# Patient Record
Sex: MALE | Race: WHITE | NOT HISPANIC OR LATINO | Employment: OTHER | URBAN - METROPOLITAN AREA
[De-identification: names, ages, dates, MRNs, and addresses within clinical notes are randomized per-mention and may not be internally consistent; named-entity substitution may affect disease eponyms.]

---

## 2019-12-09 ENCOUNTER — HISTORICAL (OUTPATIENT)
Dept: RADIOLOGY | Facility: HOSPITAL | Age: 59
End: 2019-12-09

## 2021-10-05 ENCOUNTER — HISTORICAL (OUTPATIENT)
Dept: INTENSIVE CARE | Facility: HOSPITAL | Age: 61
End: 2021-10-05

## 2021-10-13 ENCOUNTER — HISTORICAL (OUTPATIENT)
Dept: INTENSIVE CARE | Facility: HOSPITAL | Age: 61
End: 2021-10-13

## 2022-04-09 ENCOUNTER — HISTORICAL (OUTPATIENT)
Dept: ADMINISTRATIVE | Facility: HOSPITAL | Age: 62
End: 2022-04-09

## 2022-04-27 VITALS — WEIGHT: 280 LBS | BODY MASS INDEX: 39.2 KG/M2 | HEIGHT: 71 IN

## 2023-01-31 ENCOUNTER — OFFICE VISIT (OUTPATIENT)
Dept: NEUROLOGY | Facility: CLINIC | Age: 63
End: 2023-01-31
Payer: COMMERCIAL

## 2023-01-31 DIAGNOSIS — G47.33 OBSTRUCTIVE SLEEP APNEA: Primary | ICD-10-CM

## 2023-01-31 PROCEDURE — 99212 PR OFFICE/OUTPT VISIT, EST, LEVL II, 10-19 MIN: ICD-10-PCS | Mod: 95,,, | Performed by: NURSE PRACTITIONER

## 2023-01-31 PROCEDURE — 99212 OFFICE O/P EST SF 10 MIN: CPT | Mod: 95,,, | Performed by: NURSE PRACTITIONER

## 2023-01-31 RX ORDER — AMLODIPINE AND BENAZEPRIL HYDROCHLORIDE 10; 20 MG/1; MG/1
1 CAPSULE ORAL DAILY
COMMUNITY

## 2023-01-31 RX ORDER — ATORVASTATIN CALCIUM 40 MG/1
40 TABLET, FILM COATED ORAL DAILY
COMMUNITY

## 2023-01-31 NOTE — PROGRESS NOTES
Telephone Visit     SUBJECTIVE:  Patient ID: Jose Hidalgo is a 62 y.o. male.  MRN: [id number]  Referred By: No ref. provider found  Past Medical History:   Diagnosis Date    Essential (primary) hypertension     High cholesterol     Obesity, unspecified     Obstructive sleep apnea        Past Surgical History:   Procedure Laterality Date    Open repair of inguinal hernia (1975)         Family History   Problem Relation Age of Onset    Alzheimer's disease Father        Social History     Socioeconomic History    Marital status:        Review of patient's allergies indicates:   Allergen Reactions    Tetanus vaccines and toxoid            Chief Complaint: Annual CPAP f/u    History of Present Illness:     Telephone visit for PAP follow up (attempted telemedicine).   Reports wearing PAP nightly. Admits nocturnal awakenings some nights, and difficulty falling back to sleep. Some nights will take Melatonin OTC to help him get a better night sleep.  Feels better the following day after PAP use; denies EDS. Denies naps  Denies problems with PAP machine. Able to get supplies.  Needs to switch supply companies d/t insurance         Review of Systems - as per HPI, otherwise a balanced 10 systems review is negative.    This is a telephone note; the patient was treated using real time audio according to [facility] protocols. Kalina RANGEL, (distant provider) conducted the visit from the location identified below. The patient participated in the visit at a non-[facility] location selected by the patient (or patient's representative), identified below. I am licensed in the state where the patient stated they are located. The patient (or patient's representative) stated that they understood and accepted the privacy and security risks to their information at their location.    Patient was located at their house  Kalina RANGEL (distant provider), was located at [facility]      OBJECTIVE:    PAP  Compliance Report  Last 30 days  Usage- 100  Usage > 4 hrs - 98  AHI -  0.5    PLAN:  Problem List Items Addressed This Visit          Other    Obstructive sleep apnea - Primary    - Encouraged continued use of PAP.   - Drowsy driving may still occur despite PAP use.   - F/u in 1 yr       Ok to continue taking melatonin OTC, prn insomnia/ frequent nocturnal awakenings. Start with 3mg, and can increase to up to 10mg nightly. Take immediately prior to sleep onset.            Audio time was spent with patient for education and counseling regarding: medical conditions, current medications including risk/ benefit and side effect/ adverse events, over the counter medications- uses/doses, home self-care and contact precautions, and red flags and indications for immediate medical attention. The patient is receptive, expresses understanding and is agreeable to the discussed plan. All of the patient's questions were answered. T     _*_single dx ___multiple issues/ diagnoses  _*_ low __mod ___ high complexity of data  _*_low __mod ___ high risks     Medical Decision Making (MDM) used for CPT choice:  __*_low  ___moderate  ____high        MARLYN Zamora  [facility]  [phone]

## 2024-04-09 DIAGNOSIS — G47.33 OBSTRUCTIVE SLEEP APNEA: Primary | ICD-10-CM

## 2024-04-10 ENCOUNTER — OFFICE VISIT (OUTPATIENT)
Dept: NEUROLOGY | Facility: CLINIC | Age: 64
End: 2024-04-10
Payer: COMMERCIAL

## 2024-04-10 DIAGNOSIS — G47.33 OBSTRUCTIVE SLEEP APNEA: Primary | ICD-10-CM

## 2024-04-10 PROCEDURE — 4010F ACE/ARB THERAPY RXD/TAKEN: CPT | Mod: CPTII,95,, | Performed by: NURSE PRACTITIONER

## 2024-04-10 PROCEDURE — 99213 OFFICE O/P EST LOW 20 MIN: CPT | Mod: 95,,, | Performed by: NURSE PRACTITIONER

## 2024-04-10 NOTE — PROGRESS NOTES
Telemedicine Visit   Established SHAZIA    SUBJECTIVE:  Patient ID: Jose Hidalgo is a 63 y.o. male.  MRN: [id number]  Referred By: No ref. provider found  Past Medical History:   Diagnosis Date    Essential (primary) hypertension     High cholesterol     Obesity, unspecified     Obstructive sleep apnea        Past Surgical History:   Procedure Laterality Date    Open repair of inguinal hernia (1975)         Family History   Problem Relation Age of Onset    Alzheimer's disease Father        Social History     Socioeconomic History    Marital status:        Review of patient's allergies indicates:   Allergen Reactions    Tetanus vaccines and toxoid        Chief Complaint: SHAZIA f/u    History of Present Illness:   Telemedicine visit for PAP follow up. Sleeping well at night with PAP. Feels better the following day after PAP use; denies EDS.     Denies problems with PAP machine.  Needs updated order for supplies; would like order to be sent to [facility]    Do not have compliance details to review.  Patient has I code connect machine    Current Medications:    Current Outpatient Medications:     amlodipine-benazepril 10-20mg (LOTREL) 10-20 mg per capsule, Take 1 capsule by mouth once daily., Disp: , Rfl:     atorvastatin (LIPITOR) 40 MG tablet, Take 40 mg by mouth once daily., Disp: , Rfl:     Review of Systems - as per HPI, otherwise a balanced 10 systems review is negative.    This is a telemedicine note; the patient was treated using telemedicine, real time audio and video, according to [facility] protocols. Kalina RANGEL, (distant provider) conducted the visit from the location identified below. The patient participated in the visit at a non-[facility] location selected by the patient (or patient's representative), identified below. I am licensed in the state where the patient stated they are located. The patient (or patient's representative) stated that they understood and accepted the privacy and security  risks to their information at their location.    Patient was located at their house [electricity out]  I, Kalina Jon (distant provider), was located at my house      OBJECTIVE:   (Limited d/t telemed visit and his electricity was out)  Well nourished, no distress  Alert, oriented  Calm, well spoken; normal affect and mood      PLAN:  Problem List Items Addressed This Visit          Other    Obstructive sleep apnea - Primary    - Patient has iCode connect machine.  Will get SD card to see if we can download  - Continues to benefit from PAP therapy. Encouraged nightly use of PAP.   - Drowsy driving may still occur despite PAP use.   - importance of healthy diet, regular exercise and sleep hygiene discussed  - RTC in 1 yr     Face to face time was spent with patient for education and counseling regarding: medical conditions, current medications including risk/ benefit and side effect/ adverse events, over the counter medications- uses/doses, home self-care and contact precautions, and red flags and indications for immediate medical attention. The patient is receptive, expresses understanding and is agreeable to the discussed plan. All of the patient's questions were answered.     _*_single dx ___multiple issues/ diagnoses  __* low __mod ___ high complexity of data  _*_low __mod ___ high risks     Medical Decision Making (MDM) used for CPT choice:  __*_low  ___moderate  ____high        MARLYN Zamora  [facility]  [phone]     Compliance Report  Last 30 days  Usage- 100  Usage > 4 hrs - 100  AHI - 0.5

## 2024-04-15 ENCOUNTER — PATIENT MESSAGE (OUTPATIENT)
Dept: NEUROLOGY | Facility: CLINIC | Age: 64
End: 2024-04-15
Payer: COMMERCIAL

## 2025-05-06 ENCOUNTER — OFFICE VISIT (OUTPATIENT)
Facility: CLINIC | Age: 65
End: 2025-05-06
Payer: COMMERCIAL

## 2025-05-06 DIAGNOSIS — G47.33 OBSTRUCTIVE SLEEP APNEA: Primary | ICD-10-CM

## 2025-05-06 PROCEDURE — 98005 SYNCH AUDIO-VIDEO EST LOW 20: CPT | Mod: 95,,, | Performed by: NURSE PRACTITIONER

## 2025-05-06 PROCEDURE — 4010F ACE/ARB THERAPY RXD/TAKEN: CPT | Mod: CPTII,95,, | Performed by: NURSE PRACTITIONER

## 2025-05-06 NOTE — PROGRESS NOTES
Telemedicine Visit   Established SHAZIA    SUBJECTIVE:  Patient ID: Jose Hidalgo is a 64 y.o. male.  MRN: [id number]  Referred By:   Past Medical History:   Diagnosis Date    Essential (primary) hypertension     High cholesterol     Obesity, unspecified     Obstructive sleep apnea        Past Surgical History:   Procedure Laterality Date    Open repair of inguinal hernia (1975)         Review of patient's allergies indicates:   Allergen Reactions    Tetanus vaccines and toxoid        Chief Complaint: SHAZIA f/u    History of Present Illness:   Telemedicine visit for PAP follow up.     Sleeping well at night with PAP. Feels better the following day after PAP use; denies EDS.     Denies problems with PAP machine. Able to get supplies @ [facility]   Wears nasal pillow    Current Medications:  Current Medications[1]    Review of Systems - as per HPI, otherwise a balanced 10 systems review is negative.    This is a telemedicine note; the patient was treated using telemedicine, real time audio and video, according to [facility] protocols. Kalina RANGEL, (distant provider) conducted the visit from the location identified below. The patient participated in the visit at a non-[facility] location selected by the patient (or patient's representative), identified below. I am licensed in the state where the patient stated they are located. The patient (or patient's representative) stated that they understood and accepted the privacy and security risks to their information at their location.    Patient was located at their house  Kalina RANGEL (distant provider), was located at [facility]      OBJECTIVE:   (Limited d/t telemed visit)  Well nourished, no distress  Alert, oriented  Calm, well spoken; normal affect and mood  Good eye contact  No lesions noted on face or arms      PLAN:  Problem List Items Addressed This Visit          Other    Obstructive sleep apnea - Primary    - Pt will bring in SD card for us to view  compliance therapy  - Continues to benefit from PAP therapy. Encouraged nightly use of PAP.   - Drowsy driving may still occur despite PAP use.   - importance of healthy diet, regular exercise and sleep hygiene discussed  - RTC in 1 yr         Face to face time was spent with patient for education and counseling regarding: medical conditions, current medications including risk/ benefit and side effect/ adverse events, over the counter medications- uses/doses, home self-care and contact precautions, and red flags and indications for immediate medical attention. The patient is receptive, expresses understanding and is agreeable to the discussed plan. All of the patient's questions were answered.     _*_single dx ___multiple issues/ diagnoses  __* low __mod ___ high complexity of data  _*_low __mod ___ high risks     Medical Decision Making (MDM) used for CPT choice:  __*_low  ___moderate  ____high        MARLYN Zamora  [facility]  [phone]         [1]   Current Outpatient Medications:     amlodipine-benazepril 10-20mg (LOTREL) 10-20 mg per capsule, Take 1 capsule by mouth once daily., Disp: , Rfl:     atorvastatin (LIPITOR) 40 MG tablet, Take 40 mg by mouth once daily., Disp: , Rfl: